# Patient Record
Sex: FEMALE
[De-identification: names, ages, dates, MRNs, and addresses within clinical notes are randomized per-mention and may not be internally consistent; named-entity substitution may affect disease eponyms.]

---

## 2019-04-15 ENCOUNTER — APPOINTMENT (OUTPATIENT)
Dept: SURGERY | Facility: CLINIC | Age: 29
End: 2019-04-15

## 2019-05-24 ENCOUNTER — APPOINTMENT (OUTPATIENT)
Dept: BREAST CENTER | Facility: CLINIC | Age: 29
End: 2019-05-24
Payer: COMMERCIAL

## 2019-05-24 VITALS
WEIGHT: 157 LBS | DIASTOLIC BLOOD PRESSURE: 64 MMHG | BODY MASS INDEX: 28.89 KG/M2 | SYSTOLIC BLOOD PRESSURE: 102 MMHG | HEIGHT: 62 IN | HEART RATE: 60 BPM

## 2019-05-24 DIAGNOSIS — Z78.9 OTHER SPECIFIED HEALTH STATUS: ICD-10-CM

## 2019-05-24 PROCEDURE — 99204 OFFICE O/P NEW MOD 45 MIN: CPT

## 2019-05-24 NOTE — PHYSICAL EXAM
[Normocephalic] : normocephalic [Atraumatic] : atraumatic [Sclera nonicteric] : sclera nonicteric [EOMI] : extra ocular movement intact [PERRL] : pupils equal, round and reactive to light [Supple] : supple [No Supraclavicular Adenopathy] : no supraclavicular adenopathy [No dominant masses] : no dominant masses left breast [No dominant masses] : no dominant masses in right breast  [Examined in the supine and seated position] : examined in the supine and seated position [No Nipple Retraction] : no left nipple retraction [Breast Nipple Inversion] : nipples not inverted [No Nipple Discharge] : no left nipple discharge [Breast Nipple Fissures] : nipples not fissured [Breast Nipple Flattening] : nipples not flattened [Breast Nipple Retraction] : nipples not retracted [Breast Abnormal Lactation (Galactorrhea)] : no galactorrhea [Breast Abnormal Secretion Bloody Fluid] : no bloody discharge [Breast Abnormal Secretion Serous Fluid] : no serous discharge [Breast Abnormal Secretion Opalescent Fluid] : no milky discharge [No Axillary Lymphadenopathy] : no left axillary lymphadenopathy [No Edema] : no edema [No Rashes] : no rashes [No Ulceration] : no ulceration [de-identified] : No supraclavicular or axillary adenopathy. No dominant masses, normal to palpation. Everted nipple without discharge. No skin changes.\par \par  [de-identified] : No supraclavicular or axillary adenopathy. No dominant masses, normal to palpation. Everted nipple without discharge. No skin changes.\par \par

## 2019-05-24 NOTE — ASSESSMENT
[FreeTextEntry1] : 29 yo with fibrocystic disease presents for palpable nodule felt by OB-GYN.  There are palpable nodule on exam today.  Her right breast ultrasound in November 2019 demonstrated multiple cysts and complicated cysts.  Recommendation for follow up in 6 months unless there is a change to her breasts prior.\par 1.  Followup in 6 months\par 2.  Mammogram to begin at age 41

## 2019-05-24 NOTE — PAST MEDICAL HISTORY
[Menstruating] : The patient is menstruating [Menarche Age ____] : age at menarche was [unfilled] [Total Preg ___] : G[unfilled]

## 2019-05-24 NOTE — HISTORY OF PRESENT ILLNESS
[FreeTextEntry1] : I had the pleasure of seeing DEEPTI JAIMES  in the office today for a new breast evaluation secondary to fibrocystic breasts.\par \par Deepti is a marbella 29 yo female who was sent for an ultrasound after OB-gyn palpated a right breast mass in the 10:00 position.  Ultrasound on 11/6/2018 demonstrated multiple subcentimeter cyst clusters and some were complicated.  She was then sent to see a breast surgeon.\par \par She denies dominant breast mass, skin changes or nipple discharge. She denies headaches, blurry vision, chest pain, SOB, abdominal pain, joint aches or difficult walking.\par \par G0.  Menses began at age 13.\par She denies personal history of malignancy.\par Denies Family history of malignancy\par \par Imaging:  [facility]\par 11/6/2018  Right breast ultrasound\par Impression:  In the right breat at 10:00 periareolar extending 3-4 cm in to the upper outer quadrant are multiple cysts and clusters of cysts as described above.  This likely accounts for the palpable symptoms.  therefore recommend further management based on findings at clinical examination.  Consider aspiration if clinically indicated.  Unless otherwise clinically indicated, in accordance with ACR guidelines, recommend further management based on findings at clinical examination.  Consider aspiration is clinically necessary.  BIRADS 2\par \par We reviewed and discussed clinical breast exam.  Both clinical breast exam and imaging are benign today.  She reports bilateral breast pain prior to menses but goes away as soon as menstrual cycle begins.  Recommendation for follow up in 6 months unless she reports changes to her breasts.  She understands and agrees to plan.

## 2019-12-23 ENCOUNTER — APPOINTMENT (OUTPATIENT)
Dept: SURGERY | Facility: CLINIC | Age: 29
End: 2019-12-23

## 2019-12-23 ENCOUNTER — APPOINTMENT (OUTPATIENT)
Dept: BREAST CENTER | Facility: CLINIC | Age: 29
End: 2019-12-23
Payer: SELF-PAY

## 2019-12-23 DIAGNOSIS — Z00.00 ENCOUNTER FOR GENERAL ADULT MEDICAL EXAMINATION W/OUT ABNORMAL FINDINGS: ICD-10-CM

## 2019-12-27 ENCOUNTER — APPOINTMENT (OUTPATIENT)
Dept: BREAST CENTER | Facility: CLINIC | Age: 29
End: 2019-12-27
Payer: SELF-PAY

## 2019-12-27 VITALS
SYSTOLIC BLOOD PRESSURE: 95 MMHG | WEIGHT: 148.05 LBS | BODY MASS INDEX: 27.24 KG/M2 | DIASTOLIC BLOOD PRESSURE: 61 MMHG | HEIGHT: 62 IN | HEART RATE: 64 BPM | OXYGEN SATURATION: 100 % | TEMPERATURE: 98.4 F

## 2019-12-27 DIAGNOSIS — N60.19 DIFFUSE CYSTIC MASTOPATHY OF UNSPECIFIED BREAST: ICD-10-CM

## 2019-12-27 DIAGNOSIS — Z85.3 ENCOUNTER FOR FOLLOW-UP EXAMINATION AFTER COMPLETED TREATMENT FOR MALIGNANT NEOPLASM: ICD-10-CM

## 2019-12-27 DIAGNOSIS — Z08 ENCOUNTER FOR FOLLOW-UP EXAMINATION AFTER COMPLETED TREATMENT FOR MALIGNANT NEOPLASM: ICD-10-CM

## 2019-12-27 PROCEDURE — 99214 OFFICE O/P EST MOD 30 MIN: CPT

## 2019-12-27 NOTE — HISTORY OF PRESENT ILLNESS
[FreeTextEntry1] : I had the pleasure of seeing DEEPTI JAIMES  in the office today for a follow up breast evaluation secondary to fibrocystic breasts.\par \par Deepti is a marbella 30 yo female who was initially evaluated after shankar OB-gyn palpated a right breast mass in the 10:00 position.  Ultrasound on 11/6/2018 demonstrated multiple subcentimeter cyst clusters and some were complicated.  She was then sent to see a breast surgeon.\par \par She denies dominant breast mass, skin changes or nipple discharge. She denies headaches, blurry vision, chest pain, SOB, abdominal pain, joint aches or difficult walking.\par \par G0.  Menses began at age 13.\par She denies personal history of malignancy.\par Denies Family history of malignancy\par \par Imaging:  [facility]\par 11/6/2018  Right breast ultrasound\par Impression:  In the right breat at 10:00 periareolar extending 3-4 cm in to the upper outer quadrant are multiple cysts and clusters of cysts as described above.  This likely accounts for the palpable symptoms.  therefore recommend further management based on findings at clinical examination.  Consider aspiration if clinically indicated.  Unless otherwise clinically indicated, in accordance with ACR guidelines, recommend further management based on findings at clinical examination.  Consider aspiration is clinically necessary.  BIRADS 2\par \par We reviewed and discussed clinical breast exam.  Both clinical breast exam and imaging are benign today.  She no longer reports breast pain.  Recommendation for follow up in 6 to 1 year unless she reports changes to either breast.  She understands and agrees to plan.

## 2019-12-27 NOTE — ASSESSMENT
[FreeTextEntry1] : 30 yo with fibrocystic disease presents for palpable nodule felt by OB-GYN.  There are no palpable nodules on exam today.  Her right breast ultrasound in November 2019 demonstrated multiple cysts and complicated cysts.  Recommendation for follow up in 6 -1 year unless there is a change to her breasts prior.\par 1.  Followup in 6 months-1 year\par 2.  Mammogram to begin at age 41

## 2019-12-27 NOTE — PHYSICAL EXAM
[Breast Nipple Retraction] : nipples not retracted [Breast Nipple Inversion] : nipples not inverted [Breast Nipple Flattening] : nipples not flattened [Breast Nipple Fissures] : nipples not fissured [Breast Abnormal Lactation (Galactorrhea)] : no galactorrhea [Breast Abnormal Secretion Bloody Fluid] : no bloody discharge [Breast Abnormal Secretion Serous Fluid] : no serous discharge [de-identified] : No supraclavicular or axillary adenopathy. No dominant masses, normal to palpation. Everted nipple without discharge. No skin changes.\par \par  [Breast Abnormal Secretion Opalescent Fluid] : no milky discharge [de-identified] : No supraclavicular or axillary adenopathy. No dominant masses, normal to palpation. Everted nipple without discharge. No skin changes.\par \par

## 2020-12-28 ENCOUNTER — APPOINTMENT (OUTPATIENT)
Dept: SURGERY | Facility: CLINIC | Age: 30
End: 2020-12-28

## 2025-09-07 ENCOUNTER — NON-APPOINTMENT (OUTPATIENT)
Age: 35
End: 2025-09-07